# Patient Record
Sex: FEMALE | Race: WHITE
[De-identification: names, ages, dates, MRNs, and addresses within clinical notes are randomized per-mention and may not be internally consistent; named-entity substitution may affect disease eponyms.]

---

## 2021-09-16 PROBLEM — Z00.00 ENCOUNTER FOR PREVENTIVE HEALTH EXAMINATION: Status: ACTIVE | Noted: 2021-09-16

## 2021-09-28 ENCOUNTER — APPOINTMENT (OUTPATIENT)
Dept: PULMONOLOGY | Facility: CLINIC | Age: 81
End: 2021-09-28
Payer: MEDICARE

## 2021-09-28 ENCOUNTER — NON-APPOINTMENT (OUTPATIENT)
Age: 81
End: 2021-09-28

## 2021-09-28 VITALS
OXYGEN SATURATION: 98 % | HEART RATE: 73 BPM | TEMPERATURE: 98.1 F | HEIGHT: 60 IN | DIASTOLIC BLOOD PRESSURE: 80 MMHG | WEIGHT: 122 LBS | BODY MASS INDEX: 23.95 KG/M2 | SYSTOLIC BLOOD PRESSURE: 135 MMHG

## 2021-09-28 DIAGNOSIS — Z85.038 PERSONAL HISTORY OF OTHER MALIGNANT NEOPLASM OF LARGE INTESTINE: ICD-10-CM

## 2021-09-28 DIAGNOSIS — E78.5 HYPERLIPIDEMIA, UNSPECIFIED: ICD-10-CM

## 2021-09-28 PROCEDURE — 99204 OFFICE O/P NEW MOD 45 MIN: CPT

## 2021-09-28 RX ORDER — ATORVASTATIN CALCIUM 20 MG/1
20 TABLET, FILM COATED ORAL
Refills: 0 | Status: ACTIVE | COMMUNITY

## 2021-09-28 RX ORDER — TRAVOPROST 0.04 MG/ML
0 SOLUTION OPHTHALMIC
Refills: 0 | Status: ACTIVE | COMMUNITY

## 2021-09-28 NOTE — ASSESSMENT
[FreeTextEntry1] : The patient is doing well but had the residual of the sub pleural nodule in may 2021  I will ask her to have a f/u ct of the chest in October

## 2021-09-28 NOTE — REVIEW OF SYSTEMS
[Postnasal Drip] : postnasal drip [Cough] : cough [Sputum] : sputum [Seasonal Allergies] : no seasonal allergies [GERD] : no gerd [Arthralgias] : arthralgias [Back Pain] : back pain [Chronic Pain] : chronic pain [Headache] : no headache [Dizziness] : no dizziness [Numbness] : no numbness [Memory Loss] : no memory loss [Tremor] : no tremor [Diabetes] : no diabetes [Thyroid Problem] : no thyroid problem [Obesity] : no obesity [Negative] : Psychiatric [TextBox_94] : all in the recent months  [TextBox_30] : intermittent cough  No cp or SOB.

## 2021-09-28 NOTE — PHYSICAL EXAM
[No Acute Distress] : no acute distress [Normal Rate/Rhythm] : normal rate/rhythm [Normal S1, S2] : normal s1, s2 [No Murmurs] : no murmurs [No Resp Distress] : no resp distress [Clear to Auscultation Bilaterally] : clear to auscultation bilaterally [No Clubbing] : no clubbing [No Cyanosis] : no cyanosis [FROM] : FROM [Normal Color/ Pigmentation] : normal color/ pigmentation [No Focal Deficits] : no focal deficits [TextBox_105] : arthritic chnges in her hands

## 2021-09-28 NOTE — REASON FOR VISIT
[Initial] : an initial visit [TextBox_44] : Pt is in office to review a scan - Pt has copy. Pt states that she always has a little cough in am, but denies any other pulmonary issues.

## 2021-09-28 NOTE — HISTORY OF PRESENT ILLNESS
[Former] : former [Never] : never [TextBox_4] : the patient is 81 year F with a hx of a lymphoma that was treated with chemo for 6mos and then with rituxin for 6mos, finishing in last November  She is doing well but a recent PET scan, in 11/2020,   revealed a small active area  subpleural in the MARQUIS She had a ct f/u in 5/2021 and the nodule was still present but the satellite lesions were resolved     She has a minimal amount of resp sx  with a cough and sputum but not at the time of the PET. She had no ABX following the PET scan  She smoked for a short time in her early 20's    She had colon cancer in 1993 chemo and surgery

## 2021-11-16 ENCOUNTER — APPOINTMENT (OUTPATIENT)
Dept: PULMONOLOGY | Facility: CLINIC | Age: 81
End: 2021-11-16
Payer: MEDICARE

## 2021-11-16 VITALS
HEART RATE: 78 BPM | HEIGHT: 60 IN | OXYGEN SATURATION: 99 % | BODY MASS INDEX: 23.95 KG/M2 | DIASTOLIC BLOOD PRESSURE: 74 MMHG | WEIGHT: 122 LBS | SYSTOLIC BLOOD PRESSURE: 122 MMHG | TEMPERATURE: 98.2 F

## 2021-11-16 PROCEDURE — 99214 OFFICE O/P EST MOD 30 MIN: CPT

## 2021-11-16 RX ORDER — MULTIVIT,IRON,MINERALS/LUTEIN
TABLET ORAL
Refills: 0 | Status: DISCONTINUED | COMMUNITY
End: 2021-11-16

## 2021-11-16 NOTE — PHYSICAL EXAM
[No Acute Distress] : no acute distress [Normal Rate/Rhythm] : normal rate/rhythm [Normal S1, S2] : normal s1, s2 [No Murmurs] : no murmurs [No Resp Distress] : no resp distress [Clear to Auscultation Bilaterally] : clear to auscultation bilaterally [No Clubbing] : no clubbing [No Cyanosis] : no cyanosis [No Edema] : no edema [FROM] : FROM [Normal Color/ Pigmentation] : normal color/ pigmentation [No Focal Deficits] : no focal deficits [Oriented x3] : oriented x3 [TextBox_105] : arthritic changes in her hands

## 2021-11-16 NOTE — ASSESSMENT
[FreeTextEntry1] : The patient is doing well but had the residual of the sub pleural nodule in may 2021  I will ask her to have a f/u ct of the chest in October    \par \par \par 11/16/2021 the patient is relatively asymptomatic except for slight cough.  The patient is being monitored for her nodules as well as extent of disease status post colon cancer.  The patient had a CAT scan of the chest in October which revealed that the right upper lobe nodule was 6 x 8 mm compared to 2021 it was 7 x 9 mm.  This implies at the lesion is stable is round and smooth and unlikely to be a stick.  The patient is to get a PET scan in December.  The patient will need a another imaging from my point of view in 1 year for the smooth stable nodule if she is to get a PET scan and at a similar time I can defer to the PET scan and review the nodule on the CT images.  Time spent with the patient was 35 minutes, with history physical examination and reviewing the imaging [id number] of the CAT scan from 2020 and 2021, counseling and the patient will be followed up in 1 year with a imaging CAT scan  or PET/CT

## 2021-11-16 NOTE — HISTORY OF PRESENT ILLNESS
[Never] : never [Former] : former [TextBox_4] : the patient is 81 year F with a hx of a lymphoma that was treated with chemo for 6mos and then with rituxin for 6mos, finishing in last November  She is doing well but a recent PET scan, in 11/2020,   revealed a small active area  subpleural in the MARQUIS She had a ct f/u in 5/2021 and the nodule was still present but the satellite lesions were resolved     She has a minimal amount of resp sx  with a cough and sputum but not at the time of the PET. She had no ABX following the PET scan  She smoked for a short time in her early 20's    She had colon cancer in 1993 chemo and surgery \par \par 11/16/2021 patient is a 81-year-old white female with history of lymphoma and colon cancer.  Patient has a slight cough that is intermittent and had a recent CAT scan of the chest in October.  The patient has no trouble with her activities of daily living.

## 2021-11-16 NOTE — REVIEW OF SYSTEMS
[Postnasal Drip] : postnasal drip [Cough] : cough [Sputum] : sputum [Seasonal Allergies] : no seasonal allergies [GERD] : no gerd [Arthralgias] : arthralgias [Back Pain] : back pain [Chronic Pain] : chronic pain [Headache] : no headache [Dizziness] : no dizziness [Numbness] : no numbness [Memory Loss] : no memory loss [Tremor] : no tremor [Diabetes] : no diabetes [Thyroid Problem] : no thyroid problem [Obesity] : no obesity [Negative] : Psychiatric [TextBox_30] : intermittent cough  [TextBox_94] : all in the recent months

## 2021-11-16 NOTE — REASON FOR VISIT
[Follow-Up] : a follow-up visit [Pulmonary Nodules] : pulmonary nodules [TextBox_44] : 2 months. CT done. Pt states she does cough up phlegm in the morning but has no pulmonary complaints.

## 2022-09-02 ENCOUNTER — NON-APPOINTMENT (OUTPATIENT)
Age: 82
End: 2022-09-02

## 2022-10-04 ENCOUNTER — NON-APPOINTMENT (OUTPATIENT)
Age: 82
End: 2022-10-04

## 2022-11-02 ENCOUNTER — APPOINTMENT (OUTPATIENT)
Dept: PULMONOLOGY | Facility: CLINIC | Age: 82
End: 2022-11-02

## 2022-11-02 VITALS
DIASTOLIC BLOOD PRESSURE: 60 MMHG | OXYGEN SATURATION: 97 % | BODY MASS INDEX: 24.54 KG/M2 | WEIGHT: 125 LBS | SYSTOLIC BLOOD PRESSURE: 118 MMHG | TEMPERATURE: 97.4 F | HEIGHT: 60 IN | HEART RATE: 70 BPM

## 2022-11-02 PROCEDURE — 99215 OFFICE O/P EST HI 40 MIN: CPT

## 2022-11-02 RX ORDER — LATANOPROST/PF 0.005 %
0.01 DROPS OPHTHALMIC (EYE)
Qty: 8 | Refills: 0 | Status: ACTIVE | COMMUNITY
Start: 2022-10-14

## 2022-11-02 NOTE — HISTORY OF PRESENT ILLNESS
[Former] : former [Never] : never [TextBox_4] : the patient is 81 year F with a hx of a lymphoma that was treated with chemo for 6mos and then with rituxin for 6mos, finishing in last November  She is doing well but a recent PET scan, in 11/2020,   revealed a small active area  subpleural in the MARQUIS She had a ct f/u in 5/2021 and the nodule was still present but the satellite lesions were resolved     She has a minimal amount of resp sx  with a cough and sputum but not at the time of the PET. She had no ABX following the PET scan  She smoked for a short time in her early 20's    She had colon cancer in 1993 chemo and surgery \par \par 11/16/2021 patient is a 81-year-old white female with history of lymphoma and colon cancer.  Patient has a slight cough that is intermittent and had a recent CAT scan of the chest in October.  The patient has no trouble with her activities of daily living. \par \par 11/3/22 the patient is an 83yo female with a hx of an abnormal ct scan  She had a repeat in october  She offers no new sx but when asked ahe has a little more Cuevas and a dry cough

## 2022-11-02 NOTE — PHYSICAL EXAM
[No Acute Distress] : no acute distress [Normal Rate/Rhythm] : normal rate/rhythm [Normal S1, S2] : normal s1, s2 [No Murmurs] : no murmurs [No Resp Distress] : no resp distress [Clear to Auscultation Bilaterally] : clear to auscultation bilaterally [No Clubbing] : no clubbing [No Cyanosis] : no cyanosis [No Edema] : no edema [FROM] : FROM [Normal Color/ Pigmentation] : normal color/ pigmentation [No Focal Deficits] : no focal deficits [Oriented x3] : oriented x3 [TextBox_68] : crackles at the very bottom of her lungs  [TextBox_105] : arthritic changes in her hands

## 2022-11-02 NOTE — ASSESSMENT
[FreeTextEntry1] : The patient is doing well but had the residual of the sub pleural nodule in may 2021  I will ask her to have a f/u ct of the chest in October    \par \par \par 11/16/2021 the patient is relatively asymptomatic except for slight cough.  The patient is being monitored for her nodules as well as extent of disease status post colon cancer.  The patient had a CAT scan of the chest in October which revealed that the right upper lobe nodule was 6 x 8 mm compared to 2021 it was 7 x 9 mm.  This implies at the lesion is stable is round and smooth and unlikely to be a stick.  The patient is to get a PET scan in December.  The patient will need a another imaging from my point of view in 1 year for the smooth stable nodule if she is to get a PET scan and at a similar time I can defer to the PET scan and review the nodule on the CT images.  Time spent with the patient was 35 minutes, with history physical examination and reviewing the imaging [id number] of the CAT scan from 2020 and 2021, counseling and the patient will be followed up in 1 year with a imaging CAT scan  or PET/CT\par \par 11/2/22 The patient is here for follow-up of a lung nodule.  She also had some fibrotic changes that needs monitoring.  Patient has slightly increased pulmonary symptoms cough and MENDEZ.  Review of the CAT scan of the chest from 2021 compared to 2022 was shown to the patient and the comparison view of the [facility] on line site.  There is no change in the nodule but there appears to be some slight increase in the interstitial lung disease.  There might even be some early honeycombing in the right lower lobe paraspinal area.  I discussed with her interstitial lung disease and pulmonary fibrosis.  There is no distinguishing features at this time with this degree of interstitial lung disease.  Discussed with her the possible relationship with collagen vascular diseases and the need for screening blood tests.  Another possibility that did not come up in the discussion was for that she had chemotherapy for the colon cancer that might be contributing to fibrotic interstitial lung disease time spent 45 minutes counseling, education, documentation, imaging reviewed, medication reviewed, inhaler demonstrated, old records reviewed, HX and PE follow-up 1 month lab tests ordered.

## 2022-11-16 ENCOUNTER — NON-APPOINTMENT (OUTPATIENT)
Age: 82
End: 2022-11-16

## 2022-12-07 ENCOUNTER — NON-APPOINTMENT (OUTPATIENT)
Age: 82
End: 2022-12-07

## 2023-01-03 ENCOUNTER — APPOINTMENT (OUTPATIENT)
Dept: PULMONOLOGY | Facility: CLINIC | Age: 83
End: 2023-01-03
Payer: MEDICARE

## 2023-01-03 PROCEDURE — 94729 DIFFUSING CAPACITY: CPT

## 2023-01-03 PROCEDURE — 94727 GAS DIL/WSHOT DETER LNG VOL: CPT

## 2023-01-03 PROCEDURE — 94010 BREATHING CAPACITY TEST: CPT

## 2023-01-13 ENCOUNTER — APPOINTMENT (OUTPATIENT)
Dept: PULMONOLOGY | Facility: CLINIC | Age: 83
End: 2023-01-13
Payer: MEDICARE

## 2023-01-13 VITALS
TEMPERATURE: 96.2 F | WEIGHT: 125 LBS | HEART RATE: 86 BPM | HEIGHT: 62 IN | OXYGEN SATURATION: 97 % | BODY MASS INDEX: 23 KG/M2 | DIASTOLIC BLOOD PRESSURE: 80 MMHG | SYSTOLIC BLOOD PRESSURE: 128 MMHG

## 2023-01-13 PROCEDURE — 99215 OFFICE O/P EST HI 40 MIN: CPT

## 2023-01-13 NOTE — HISTORY OF PRESENT ILLNESS
[TextBox_4] : the patient is 81 year F with a hx of a lymphoma that was treated with chemo for 6mos and then with rituxin for 6mos, finishing in last November  She is doing well but a recent PET scan, in 11/2020,   revealed a small active area  subpleural in the MARQUIS She had a ct f/u in 5/2021 and the nodule was still present but the satellite lesions were resolved     She has a minimal amount of resp sx  with a cough and sputum but not at the time of the PET. She had no ABX following the PET scan  She smoked for a short time in her early 20's    She had colon cancer in 1993 chemo and surgery \par \par 11/16/2021 patient is a 81-year-old white female with history of lymphoma and colon cancer.  Patient has a slight cough that is intermittent and had a recent CAT scan of the chest in October.  The patient has no trouble with her activities of daily living. \par \par 11/3/22 the patient is an 83yo female with a hx of an abnormal ct scan  She had a repeat in october  She offers no new sx but when asked ahe has a little more Cuevas and a dry cough \par \par 1/13/2023 the patient returns the office with no change in her symptoms which dyspnea on exertion and a dry cough.  Patient had blood work and a pulmonary function test

## 2023-01-13 NOTE — ASSESSMENT
[FreeTextEntry1] : The patient is doing well but had the residual of the sub pleural nodule in may 2021  I will ask her to have a f/u ct of the chest in October    \par \par \par 11/16/2021 the patient is relatively asymptomatic except for slight cough.  The patient is being monitored for her nodules as well as extent of disease status post colon cancer.  The patient had a CAT scan of the chest in October which revealed that the right upper lobe nodule was 6 x 8 mm compared to 2021 it was 7 x 9 mm.  This implies at the lesion is stable is round and smooth and unlikely to be a stick.  The patient is to get a PET scan in December.  The patient will need a another imaging from my point of view in 1 year for the smooth stable nodule if she is to get a PET scan and at a similar time I can defer to the PET scan and review the nodule on the CT images.  Time spent with the patient was 35 minutes, with history physical examination and reviewing the imaging [id number] of the CAT scan from 2020 and 2021, counseling and the patient will be followed up in 1 year with a imaging CAT scan  or PET/CT\par \par 11/2/22 The patient is here for follow-up of a lung nodule.  She also had some fibrotic changes that needs monitoring.  Patient has slightly increased pulmonary symptoms cough and MENDEZ.  Review of the CAT scan of the chest from 2021 compared to 2022 was shown to the patient and the comparison view of the [facility] on line site.  There is no change in the nodule but there appears to be some slight increase in the interstitial lung disease.  There might even be some early honeycombing in the right lower lobe paraspinal area.  I discussed with her interstitial lung disease and pulmonary fibrosis.  There is no distinguishing features at this time with this degree of interstitial lung disease.  Discussed with her the possible relationship with collagen vascular diseases and the need for screening blood tests.  Another possibility that did not come up in the discussion was for that she had chemotherapy for the colon cancer that might be contributing to fibrotic interstitial lung disease time spent 45 minutes counseling, education, documentation, imaging reviewed, medication reviewed, inhaler demonstrated, old records reviewed, HX and PE follow-up 1 month lab tests ordered.\par \par 1/13/2023 the patient had blood work done to look for markers for rheumatological disease.  The initial blood work basic rheumatological work-up was negative his sed rate was normal CPR was normal.  Her PFTs showed normal volumes and normal spirometry but a reduced DLCO of 52 and specific DLCO of 61%.  At this time I demonstrated the findings to the patient again on the CAT scan  peripheral interstitial markings were mildly increased or extensive rheumatological profile will be ordered, ALFIE  C3, C4, Selam-1, SCl-70,  CCP, ANCAs,  HLA-B27, CPK, , TSH,\par time spent 45min  counseling, education, documentation, imaging reviewed, medication reviewed, , old records reviewed, HX and PE \par \par

## 2023-01-13 NOTE — REASON FOR VISIT
[Follow-Up] : a follow-up visit [Pulmonary Nodules] : pulmonary nodules [TextBox_44] : No symptoms, interstitial lung disease

## 2023-04-14 ENCOUNTER — APPOINTMENT (OUTPATIENT)
Dept: PULMONOLOGY | Facility: CLINIC | Age: 83
End: 2023-04-14
Payer: MEDICARE

## 2023-04-14 VITALS
HEART RATE: 73 BPM | SYSTOLIC BLOOD PRESSURE: 130 MMHG | DIASTOLIC BLOOD PRESSURE: 68 MMHG | WEIGHT: 128 LBS | TEMPERATURE: 95.6 F | HEIGHT: 62 IN | OXYGEN SATURATION: 97 % | BODY MASS INDEX: 23.55 KG/M2

## 2023-04-14 PROCEDURE — 99214 OFFICE O/P EST MOD 30 MIN: CPT

## 2023-04-14 NOTE — ASSESSMENT
[FreeTextEntry1] : The patient is doing well but had the residual of the sub pleural nodule in may 2021  I will ask her to have a f/u ct of the chest in October    \par \par \par 11/16/2021 the patient is relatively asymptomatic except for slight cough.  The patient is being monitored for her nodules as well as extent of disease status post colon cancer.  The patient had a CAT scan of the chest in October which revealed that the right upper lobe nodule was 6 x 8 mm compared to 2021 it was 7 x 9 mm.  This implies at the lesion is stable is round and smooth and unlikely to be a stick.  The patient is to get a PET scan in December.  The patient will need a another imaging from my point of view in 1 year for the smooth stable nodule if she is to get a PET scan and at a similar time I can defer to the PET scan and review the nodule on the CT images.  Time spent with the patient was 35 minutes, with history physical examination and reviewing the imaging psie-em-[id number] of the CAT scan from 2020 and 2021, counseling and the patient will be followed up in 1 year with a imaging CAT scan  or PET/CT\par \par 11/2/22 The patient is here for follow-up of a lung nodule.  She also had some fibrotic changes that needs monitoring.  Patient has slightly increased pulmonary symptoms cough and MENDEZ.  Review of the CAT scan of the chest from 2021 compared to 2022 was shown to the patient and the comparison view of the [facility] on line site.  There is no change in the nodule but there appears to be some slight increase in the interstitial lung disease.  There might even be some early honeycombing in the right lower lobe paraspinal area.  I discussed with her interstitial lung disease and pulmonary fibrosis.  There is no distinguishing features at this time with this degree of interstitial lung disease.  Discussed with her the possible relationship with collagen vascular diseases and the need for screening blood tests.  Another possibility that did not come up in the discussion was for that she had chemotherapy for the colon cancer that might be contributing to fibrotic interstitial lung disease time spent 45 minutes counseling, education, documentation, imaging reviewed, medication reviewed, inhaler demonstrated, old records reviewed, HX and PE follow-up 1 month lab tests ordered.\par \par 1/13/2023 the patient had blood work done to look for markers for rheumatological disease.  The initial blood work basic rheumatological work-up was negative his sed rate was normal CPR was normal.  Her PFTs showed normal volumes and normal spirometry but a reduced DLCO of 52 and specific DLCO of 61%.  At this time I demonstrated the findings to the patient again on the CAT scan  peripheral interstitial markings were mildly increased or extensive rheumatological profile will be ordered, ALFIE  C3, C4, Selam-1, SCl-70,  CCP, ANCAs,  HLA-B27, CPK, , TSH,\par time spent 45min  counseling, education, documentation, imaging reviewed, medication reviewed, , old records reviewed, HX and PE \par \par 4/14/23  The patient is doing well she sais she feels great.  She did not have crackles in her lungs The PFTs will need to be followed as the DLco  is low   and the ct of the chest  to be done if there is a decline in her PFT DLCO  time spent 30 minutes counseling, education, documentation, imaging reviewed, medication reviewed, old records reviewed, HX and PE \par \par

## 2023-04-14 NOTE — REASON FOR VISIT
[Follow-Up] : a follow-up visit [Pulmonary Nodules] : pulmonary nodules [TextBox_44] : 3 months. Pt has no pulmonary issues at this time.

## 2023-04-14 NOTE — HISTORY OF PRESENT ILLNESS
[TextBox_4] : the patient is 81 year F with a hx of a lymphoma that was treated with chemo for 6mos and then with Rituxan for 6mos, finishing in last November  She is doing well but a recent PET scan, in 11/2020,   revealed a small active area  subpleural in the MARQUIS She had a ct f/u in 5/2021 and the nodule was still present but the satellite lesions were resolved     She has a minimal amount of resp sx  with a cough and sputum but not at the time of the PET. She had no ABX following the PET scan  She smoked for a short time in her early 20's    She had colon cancer in 1993 chemo and surgery \par \par 11/16/2021 patient is a 81-year-old white female with history of lymphoma and colon cancer.  Patient has a slight cough that is intermittent and had a recent CAT scan of the chest in October.  The patient has no trouble with her activities of daily living. \par \par 11/3/22 the patient is an 83yo female with a hx of an abnormal ct scan  She had a repeat in october  She offers no new sx but when asked ahe has a little more Cuevas and a dry cough \par \par 1/13/2023 the patient returns the office with no change in her symptoms which dyspnea on exertion and a dry cough.  Patient had blood work and a pulmonary function test\par 4/14/23  The patient is a 83yo female with a sub pleural ground that was pet scan advid.  She has a hx of colon cancer and lymphoma.  She had surgery and chemo for the colon cancer and Rituxan for the lymphoma.    The patient has had screening blood work for inflammatory markers and then for CTD markers

## 2023-09-16 ENCOUNTER — NON-APPOINTMENT (OUTPATIENT)
Age: 83
End: 2023-09-16

## 2023-10-17 ENCOUNTER — APPOINTMENT (OUTPATIENT)
Dept: PULMONOLOGY | Facility: CLINIC | Age: 83
End: 2023-10-17
Payer: MEDICARE

## 2023-10-17 VITALS
WEIGHT: 124 LBS | HEART RATE: 83 BPM | OXYGEN SATURATION: 98 % | BODY MASS INDEX: 22.82 KG/M2 | HEIGHT: 62 IN | SYSTOLIC BLOOD PRESSURE: 110 MMHG | TEMPERATURE: 96.2 F | DIASTOLIC BLOOD PRESSURE: 72 MMHG

## 2023-10-17 PROCEDURE — 99215 OFFICE O/P EST HI 40 MIN: CPT

## 2024-03-04 ENCOUNTER — NON-APPOINTMENT (OUTPATIENT)
Age: 84
End: 2024-03-04

## 2024-03-05 ENCOUNTER — APPOINTMENT (OUTPATIENT)
Dept: PULMONOLOGY | Facility: CLINIC | Age: 84
End: 2024-03-05
Payer: MEDICARE

## 2024-03-05 VITALS
WEIGHT: 123 LBS | BODY MASS INDEX: 22.63 KG/M2 | TEMPERATURE: 97.7 F | OXYGEN SATURATION: 100 % | HEART RATE: 68 BPM | SYSTOLIC BLOOD PRESSURE: 110 MMHG | HEIGHT: 62 IN | DIASTOLIC BLOOD PRESSURE: 74 MMHG

## 2024-03-05 DIAGNOSIS — R91.1 SOLITARY PULMONARY NODULE: ICD-10-CM

## 2024-03-05 DIAGNOSIS — J84.9 INTERSTITIAL PULMONARY DISEASE, UNSPECIFIED: ICD-10-CM

## 2024-03-05 PROCEDURE — 94727 GAS DIL/WSHOT DETER LNG VOL: CPT

## 2024-03-05 PROCEDURE — 99214 OFFICE O/P EST MOD 30 MIN: CPT | Mod: 25

## 2024-03-05 PROCEDURE — 94010 BREATHING CAPACITY TEST: CPT

## 2024-03-05 PROCEDURE — 94729 DIFFUSING CAPACITY: CPT

## 2024-03-05 NOTE — ASSESSMENT
[FreeTextEntry1] : 10/17/2023 Mrs. Scherer has no acute pulmonary symptoms. Patient had a CAT scan that was reviewed with the patient on line, there was similar increased interstitial markings. The patient clinically has not gotten more short of breath. The pulmonary function tests were not done today but she had mild reduction of her spDLCO to 62% the fact that the patient is not clinically any worse she will have the repeat pulmonary function test on her return in 3 months. Time spent 45 min counseling, education, documentation, imaging reviewed, medication reviewed, old records reviewed, HX and PE.   3/5/2024 1) the patient has no acute pulmonary symptoms he tries to stay active but the place that she was having an exercise program has been closed close illness of the director.  2) patient's pulmonary function test DLCO is slight decrease compared to a previous test 3) the patient is supposed to have a follow-up with Dr. Reyes with a PET/CT and the patient will have Dr. Reyes send a copy of that report to the office, the pulmonary nodules that are present on the scans go back to a PET /CAT scan of 2020 3) the patient be asked to have a repeat pulmonary function test in 3 months.  4) the CT scanning of 2021 was reviewed with the patient 5) patient was urged to find another exercise program time spent 30 minutes

## 2024-03-05 NOTE — PHYSICAL EXAM
[No Acute Distress] : no acute distress [Normal Rate/Rhythm] : normal rate/rhythm [Normal S1, S2] : normal s1, s2 [No Murmurs] : no murmurs [No Resp Distress] : no resp distress [Clear to Auscultation Bilaterally] : clear to auscultation bilaterally [No Cyanosis] : no cyanosis [No Clubbing] : no clubbing [FROM] : FROM [No Edema] : no edema [Normal Color/ Pigmentation] : normal color/ pigmentation [No Focal Deficits] : no focal deficits [Oriented x3] : oriented x3 [TextBox_54] : Patient had a cardiac workup and found to have a murmur but no critical loss of function [TextBox_68] : crackles at the very bottom of her lungs  [TextBox_105] : arthritic changes in her hands

## 2024-03-05 NOTE — HISTORY OF PRESENT ILLNESS
[Never] : never [TextBox_4] : 10/17/2023 Crispin Scherer is 83-year-old white female with a history of lymphoma and interstitial disease. The patient is a never smoker she is followed by Dr. Reyes for her lymphoma. Patient had a pet scan in July where there was no head adamant areas. The patient has a dominant 8 mm x 6 mm nodule that remains unchanged. Patient offers no new respiratory symptoms. There is also been noted some increased interstitial markings that are suggestive of interstitial lung disease.   Smoking Status: never   eCigarettes: never   Marijuana use: never 35 2024 Theresa Scherer  is a 82 yo female with a hx as above.  The patient is here for follow-up of her pulmonary function test and further clinical assessment of the lung function.  Patient's CAT scan showed a dominant 8 x 6 mm nodule in the past and a pulmonary function test showed normal function except for decreased DLCO

## 2024-03-05 NOTE — REASON FOR VISIT
[Follow-Up] : a follow-up visit [Pulmonary Nodules] : pulmonary nodules [TextEntry] : 5 month. Patient had no pulmonary complains.

## 2024-03-05 NOTE — REVIEW OF SYSTEMS
[Postnasal Drip] : postnasal drip [Cough] : cough [Sputum] : sputum [Arthralgias] : arthralgias [Back Pain] : back pain [Chronic Pain] : chronic pain [Negative] : Psychiatric [Seasonal Allergies] : no seasonal allergies [GERD] : no gerd [Headache] : no headache [Dizziness] : no dizziness [Numbness] : no numbness [Memory Loss] : no memory loss [Tremor] : no tremor [Diabetes] : no diabetes [Thyroid Problem] : no thyroid problem [Obesity] : no obesity [TextBox_30] : intermittent cough  [TextBox_94] : all in the recent months

## 2024-04-10 ENCOUNTER — OFFICE (OUTPATIENT)
Dept: URBAN - METROPOLITAN AREA CLINIC 12 | Facility: CLINIC | Age: 84
Setting detail: OPHTHALMOLOGY
End: 2024-04-10
Payer: MEDICARE

## 2024-04-10 DIAGNOSIS — H25.89: ICD-10-CM

## 2024-04-10 DIAGNOSIS — H40.1131: ICD-10-CM

## 2024-04-10 DIAGNOSIS — H25.13: ICD-10-CM

## 2024-04-10 PROCEDURE — 99213 OFFICE O/P EST LOW 20 MIN: CPT | Performed by: STUDENT IN AN ORGANIZED HEALTH CARE EDUCATION/TRAINING PROGRAM

## 2024-04-10 PROCEDURE — 92083 EXTENDED VISUAL FIELD XM: CPT | Performed by: STUDENT IN AN ORGANIZED HEALTH CARE EDUCATION/TRAINING PROGRAM

## 2024-04-10 PROCEDURE — 92133 CPTRZD OPH DX IMG PST SGM ON: CPT | Performed by: STUDENT IN AN ORGANIZED HEALTH CARE EDUCATION/TRAINING PROGRAM

## 2024-04-10 PROCEDURE — 92020 GONIOSCOPY: CPT | Performed by: STUDENT IN AN ORGANIZED HEALTH CARE EDUCATION/TRAINING PROGRAM

## 2024-09-06 ENCOUNTER — APPOINTMENT (OUTPATIENT)
Dept: PULMONOLOGY | Facility: CLINIC | Age: 84
End: 2024-09-06